# Patient Record
Sex: MALE | Race: BLACK OR AFRICAN AMERICAN | Employment: FULL TIME | URBAN - METROPOLITAN AREA
[De-identification: names, ages, dates, MRNs, and addresses within clinical notes are randomized per-mention and may not be internally consistent; named-entity substitution may affect disease eponyms.]

---

## 2017-01-21 ENCOUNTER — HOSPITAL ENCOUNTER (EMERGENCY)
Age: 27
Discharge: HOME | End: 2017-01-21
Attending: FAMILY MEDICINE
Payer: COMMERCIAL

## 2017-01-21 VITALS
RESPIRATION RATE: 22 BRPM | BODY MASS INDEX: 32.2 KG/M2 | HEIGHT: 71 IN | SYSTOLIC BLOOD PRESSURE: 128 MMHG | WEIGHT: 230 LBS | OXYGEN SATURATION: 100 % | HEART RATE: 89 BPM | TEMPERATURE: 98.4 F | DIASTOLIC BLOOD PRESSURE: 81 MMHG

## 2017-01-21 DIAGNOSIS — K13.79 UVULAR EDEMA: Primary | ICD-10-CM

## 2017-01-21 PROCEDURE — 74011250636 HC RX REV CODE- 250/636: Performed by: FAMILY MEDICINE

## 2017-01-21 PROCEDURE — 96374 THER/PROPH/DIAG INJ IV PUSH: CPT

## 2017-01-21 PROCEDURE — 99285 EMERGENCY DEPT VISIT HI MDM: CPT

## 2017-01-21 PROCEDURE — 74011000250 HC RX REV CODE- 250: Performed by: FAMILY MEDICINE

## 2017-01-21 PROCEDURE — 74011250636 HC RX REV CODE- 250/636

## 2017-01-21 PROCEDURE — 96375 TX/PRO/DX INJ NEW DRUG ADDON: CPT

## 2017-01-21 RX ORDER — PREDNISONE 10 MG/1
TABLET ORAL
Qty: 1 PACKAGE | Refills: 0 | Status: SHIPPED | OUTPATIENT
Start: 2017-01-21

## 2017-01-21 RX ORDER — FAMOTIDINE 10 MG/ML
20 INJECTION INTRAVENOUS
Status: COMPLETED | OUTPATIENT
Start: 2017-01-21 | End: 2017-01-21

## 2017-01-21 RX ORDER — DIPHENHYDRAMINE HYDROCHLORIDE 50 MG/ML
50 INJECTION, SOLUTION INTRAMUSCULAR; INTRAVENOUS
Status: COMPLETED | OUTPATIENT
Start: 2017-01-21 | End: 2017-01-21

## 2017-01-21 RX ORDER — EPINEPHRINE 1 MG/ML
INJECTION, SOLUTION, CONCENTRATE INTRAVENOUS
Status: COMPLETED
Start: 2017-01-21 | End: 2017-01-21

## 2017-01-21 RX ADMIN — DIPHENHYDRAMINE HYDROCHLORIDE 50 MG: 50 INJECTION, SOLUTION INTRAMUSCULAR; INTRAVENOUS at 08:43

## 2017-01-21 RX ADMIN — EPINEPHRINE: 1 INJECTION, SOLUTION INTRAMUSCULAR; SUBCUTANEOUS at 08:27

## 2017-01-21 RX ADMIN — METHYLPREDNISOLONE SODIUM SUCCINATE 125 MG: 125 INJECTION, POWDER, FOR SOLUTION INTRAMUSCULAR; INTRAVENOUS at 08:34

## 2017-01-21 RX ADMIN — FAMOTIDINE 20 MG: 10 INJECTION, SOLUTION INTRAVENOUS at 08:38

## 2017-01-21 NOTE — DISCHARGE INSTRUCTIONS
Allergic Reaction: Care Instructions  Your Care Instructions  An allergic reaction is an excessive response from your immune system to a medicine, chemical, food, insect bite, or other substance. A reaction can range from mild to life-threatening. Some people have a mild rash, hives, and itching or stomach cramps. In severe reactions, swelling of your tongue and throat can close up your airway so that you cannot breathe. Follow-up care is a key part of your treatment and safety. Be sure to make and go to all appointments, and call your doctor if you are having problems. It's also a good idea to know your test results and keep a list of the medicines you take. How can you care for yourself at home? · If you know what caused your allergic reaction, be sure to avoid it. Your allergy may become more severe each time you have a reaction. · Take an over-the-counter antihistamine, such as cetirizine (Zyrtec) or loratadine (Claritin), to treat mild symptoms. Read and follow directions on the label. Some antihistamines can make you feel sleepy. Do not give antihistamines to a child unless you have checked with your doctor first. Mild symptoms include sneezing or an itchy or runny nose; an itchy mouth; a few hives or mild itching; and mild nausea or stomach discomfort. · Do not scratch hives or a rash. Put a cold, moist towel on them or take cool baths to relieve itching. Put ice packs on hives, swelling, or insect stings for 10 to 15 minutes at a time. Put a thin cloth between the ice pack and your skin. Do not take hot baths or showers. They will make the itching worse. · Your doctor may prescribe a shot of epinephrine to carry with you in case you have a severe reaction. Learn how to give yourself the shot and keep it with you at all times. Make sure it is not . · Go to the emergency room every time you have a severe reaction, even if you have used your shot of epinephrine and are feeling better. Symptoms can come back after a shot. · Wear medical alert jewelry that lists your allergies. You can buy this at most drugstores. · If your child has a severe allergy, make sure that his or her teachers, babysitters, coaches, and other caregivers know about the allergy. They should have an epinephrine shot, know how and when to give it, and have a plan to take your child to the hospital.  When should you call for help? Give an epinephrine shot if:  · You think you are having a severe allergic reaction. · You have symptoms in more than one body area, such as mild nausea and an itchy mouth. After giving an epinephrine shot call 911, even if you feel better. Call 911 if:  · You have symptoms of a severe allergic reaction. These may include:  ¨ Sudden raised, red areas (hives) all over your body. ¨ Swelling of the throat, mouth, lips, or tongue. ¨ Trouble breathing. ¨ Passing out (losing consciousness). Or you may feel very lightheaded or suddenly feel weak, confused, or restless. · You have been given an epinephrine shot, even if you feel better. Call your doctor now or seek immediate medical care if:  · You have symptoms of an allergic reaction, such as:  ¨ A rash or hives (raised, red areas on the skin). ¨ Itching. ¨ Swelling. ¨ Belly pain, nausea, or vomiting. Watch closely for changes in your health, and be sure to contact your doctor if:  · You do not get better as expected. Where can you learn more? Go to http://vane-vince.info/. Enter L740 in the search box to learn more about \"Allergic Reaction: Care Instructions. \"  Current as of: February 12, 2016  Content Version: 11.1  © 3206-3499 [employer]. Care instructions adapted under license by [employer] (which disclaims liability or warranty for this information).  If you have questions about a medical condition or this instruction, always ask your healthcare professional. Macy Galvez disclaims any warranty or liability for your use of this information.

## 2017-01-21 NOTE — ED NOTES
Pt hourly rounding competed. Safety   Pt (X) resting on stretcher with side rails up and call bell in reach. () in chair    () in parents arms. Toileting   Pt offered ()Bedpan     ()Assistance to Restroom     ()Urinal  Ongoing Updates  Updated on plan of care and status of test results. Pain Management  Inquired as to comfort and offered comfort measures:    (X) warm blankets   () dimmed lights  Pt sitting upright in stretcher with family at bedside.

## 2017-01-21 NOTE — ED NOTES
Pt given suction and emesis bag for comfort. Pt sitting with HOB at 90 degrees. Pt told to notify this RN w/ any difficulty breathing or distress. Pt verbalizes understanding. Side rails raised and call light within reach. Family at bedside.

## 2017-01-21 NOTE — ED NOTES
Pt hourly rounding competed. Safety   Pt (X) resting on stretcher with side rails up and call bell in reach. () in chair    () in parents arms. Toileting   Pt offered ()Bedpan     ()Assistance to Restroom     ()Urinal  Ongoing Updates  Updated on plan of care and status of test results. Pain Management  Inquired as to comfort and offered comfort measures:    (X) warm blankets   () dimmed lights  Pt w/ family at bedside. Pt w/ noted improvement and decreased swelling to uvula. Pt able to speak in full clear sentences.

## 2017-01-21 NOTE — ED PROVIDER NOTES
[address]  EMERGENCY DEPARTMENT HISTORY AND PHYSICAL EXAM       Date: 1/21/2017   Patient Name: Elke Boston   YOB: 1990  Medical Record Number: [id number]    History of Presenting Illness     Chief Complaint   Patient presents with    Sore Throat        History Provided By:  patient    Additional History:   8:22 AM   Elke Boston is a 31 y.o. male who presents to the emergency department C/O throat swelling when he woke up this morning. Associated sxs include SOB and difficulty swallowing his secretions. Despite the difficulty to swallow his secretions, he is still able to. Denies cough, chest tightness, and any other sxs or complaints. Primary Care Provider: None   Specialist:    Past History     Past Medical History:   No past medical history on file. Past Surgical History:   No past surgical history on file. Family History:   No family history on file. Social History:   Social History   Substance Use Topics    Smoking status: Never Smoker    Smokeless tobacco: Not on file    Alcohol use Yes      Comment: socially        Allergies:   No Known Allergies     Review of Systems   Review of Systems   HENT: Positive for sore throat (throat swelling) and trouble swallowing. Respiratory: Positive for shortness of breath. Negative for cough and chest tightness. All other systems reviewed and are negative. Physical Exam  Vitals:    01/21/17 1030 01/21/17 1100 01/21/17 1130 01/21/17 1200   BP: 131/80 134/82 124/72 128/81   Pulse: 99 82 85 89   Resp: 16 17 14 22   Temp:       SpO2: 100% 100% 100% 100%   Weight:       Height:           Physical Exam   Nursing note and vitals reviewed. Vital signs and nursing notes reviewed    CONSTITUTIONAL: Alert, in mild distress; well-developed; well-nourished. HEAD:  Normocephalic, atraumatic  EYES: PERRL; EOM's intact. ENTM: Nose: no rhinorrhea;  Throat: no erythema or exudate, mucous membranes moist. Some moderate. edema of the uvula. No other swelling to the soft palate  Neck:  No JVD, supple without lymphadenopathy  RESP: Chest clear, equal breath sounds. No stridor, no wheezes. CV: S1 and S2 WNL; No murmurs, gallops or rubs. GI: Normal bowel sounds, abdomen soft and non-tender. No masses or organomegaly. : No costo-vertebral angle tenderness. BACK:  Non-tender  UPPER EXT:  Normal inspection  LOWER EXT: No edema, no calf tenderness. Distal pulses intact. NEURO: CN intact, reflexes 2/4 and sym, strength 5/5 and sym, sensation intact. SKIN: No rashes; Normal for age and stage. PSYCH:  Alert and oriented, normal affect. Diagnostic Study Results     Labs -    No results found for this or any previous visit (from the past 12 hour(s)). Radiologic Studies -  No orders to display      Medical Decision Making   I am the first provider for this patient. I reviewed the vital signs, available nursing notes, past medical history, past surgical history, family history and social history. Vital Signs-Reviewed the patient's vital signs. Patient Vitals for the past 12 hrs:   Temp Pulse Resp BP SpO2   01/21/17 1200 - 89 22 128/81 100 %   01/21/17 1130 - 85 14 124/72 100 %   01/21/17 1100 - 82 17 134/82 100 %   01/21/17 1030 - 99 16 131/80 100 %   01/21/17 1000 - 91 14 135/89 99 %   01/21/17 0945 - (!) 102 18 (!) 136/96 100 %   01/21/17 0930 - 88 19 137/75 99 %   01/21/17 0915 - 91 19 140/69 100 %   01/21/17 0900 - 92 22 160/78 100 %   01/21/17 0830 - 83 16 (!) 146/94 100 %   01/21/17 0827 - 74 - - -   01/21/17 0825 98.4 °F (36.9 °C) - 17 (!) 151/110 100 %       Pulse Oximetry Analysis - Normal 99% on room air     Cardiac Monitor:   Rate: 90 bpm  Rhythm: Normal Sinus Rhythm      Old Medical Records: Nursing notes. Provider Notes:     INITIAL CLINICAL IMPRESSION and PLANS:  The patient presents with the primary complaint(s) of: throat swelling.  The presentation, to include historical aspects and clinical findings are consistent with the DX of allergic reaction. However, other possible DX's to consider as primary, associated with, or exacerbated by include:    1. Angioedema      ED Course:     Medications Given in the ED:  Medications   EPINEPHrine HCl (PF) (ADRENALIN) 1 mg/mL (1 mL) injection (  Given 1/21/17 0827)   diphenhydrAMINE (BENADRYL) injection 50 mg (50 mg IntraVENous Given 1/21/17 0843)   famotidine (PF) (PEPCID) injection 20 mg (20 mg IntraVENous Given 1/21/17 0838)   methylPREDNISolone (PF) (SOLU-MEDROL) injection 125 mg (125 mg IntraVENous Given 1/21/17 0834)        PROGRESS NOTE:  8:22 AM   Initial assessment performed. PROGRESS NOTE:   8:35 AM  Pt was given emergent epinephrine as well as solumedrol, Pepcid and Benadryl. Will closely monitor. Written by Pricilla Baird ED Scribe, as dictated by Aston Hernandez MD.     PROGRESS NOTE:   9:47 AM  Pt has been re-examined by Aston Hernandez MD. He is doing better and looks more comfortable. He is able to speak and swallow secretions. His uvula is smaller and there is no stridor. His vitals remain stable. Will continue to observe. Written by GINNY Ragsdaleibe, as dictated by Aston Hernandez MD.     PROGRESS NOTE:   11:54 AM  Pt has been re-examined by Aston Hernandez MD. Pt is feeling much better. The uvula is almost resolved to normal size. Written by GINNY Ragsdaleibe, as dictated by Aston Hernandez MD.     Discharge Note:  11:54 AM  Pt has been reexamined. Patient has no new complaints, changes, or physical findings. Care plan outlined and precautions discussed. Results were reviewed with the patient. All medications were reviewed with the patient; will d/c home with Prednisone. All of pt's questions and concerns were addressed. Patient was instructed and agrees to follow up with his PCP, as well as to return to the ED upon further deterioration. Patient is ready to go home.     Critical Care Time: 30 minutes    Diagnosis   Clinical Impression:   1. Uvular edema         Discussion: Pt presented with trouble swallowing and allergic reaction involving his uvula. There was edema initially. He was given subcutaneous EPI as well as some Benadryl, Solumedrol, and Pepcid. He was observed for almost 4 hours. His sxs were resolved at discharge and he will be going home with prednisone. Follow-up Information     Follow up With Details Comments [facility], DO Schedule an appointment as soon as possible for a visit in 2 days Or your primary care provider for follow up [address]      [facility] EMERGENCY DEPT  As needed, If symptoms worsen [address]  [phone]          Discharge Medication List as of 1/21/2017 12:06 PM      START taking these medications    Details   predniSONE (STERAPRED DS) 10 mg dose pack 4 tablets x 3 days, 3 tablets x 3 days, 2 tablets x 3 days, 1 tablet x 3 days, Normal, Disp-1 Package, R-0             _______________________________   Attestations: This note is prepared by Bell Srinivasan, acting as a Scribe for Clark Quiros MD on 8:22 AM on 1/21/2017 . Clark Quiros MD: The scribe's documentation has been prepared under my direction and personally reviewed by me in its entirety.   _______________________________

## 2017-01-21 NOTE — ED TRIAGE NOTES
C/o swelling to the back of his throat since he woke up this morning. Sepsis Screening completed    (  )Patient meets SIRS criteria. (x  )Patient does not meet SIRS criteria.       SIRS Criteria is achieved when two or more of the following are present   Temperature < 96.8°F (36°C) or > 100.9°F (38.3°C)   Heart Rate > 90 beats per minute   Respiratory Rate > 20 beats per minute   WBC count > 12,000 or <4,000 or > 10% bands

## 2017-01-21 NOTE — ED NOTES
Discharge instructions reviewed with the patient with opportunity for questions given. The patient verbalized understanding. Patient armband removed and shredded. Patient in stable condition at time of discharge. Patient's ride present at bedside.

## 2017-01-21 NOTE — ED NOTES
Pt hourly rounding competed. Safety   Pt (X) resting on stretcher with side rails up and call bell in reach. () in chair    () in parents arms. Toileting   Pt offered ()Bedpan     ()Assistance to Restroom     ()Urinal  Ongoing Updates  Updated on plan of care and status of test results.   Pain Management  Inquired as to comfort and offered comfort measures:    (X) warm blankets   () dimmed lights